# Patient Record
Sex: FEMALE | Race: BLACK OR AFRICAN AMERICAN | URBAN - METROPOLITAN AREA
[De-identification: names, ages, dates, MRNs, and addresses within clinical notes are randomized per-mention and may not be internally consistent; named-entity substitution may affect disease eponyms.]

---

## 2021-12-30 ENCOUNTER — WEB ENCOUNTER (OUTPATIENT)
Dept: URBAN - METROPOLITAN AREA CLINIC 40 | Facility: CLINIC | Age: 61
End: 2021-12-30

## 2021-12-30 ENCOUNTER — OFFICE VISIT (OUTPATIENT)
Dept: URBAN - METROPOLITAN AREA CLINIC 40 | Facility: CLINIC | Age: 61
End: 2021-12-30
Payer: COMMERCIAL

## 2021-12-30 VITALS
DIASTOLIC BLOOD PRESSURE: 78 MMHG | BODY MASS INDEX: 35.37 KG/M2 | HEART RATE: 84 BPM | OXYGEN SATURATION: 98 % | TEMPERATURE: 97.7 F | WEIGHT: 199.6 LBS | HEIGHT: 63 IN | SYSTOLIC BLOOD PRESSURE: 114 MMHG

## 2021-12-30 DIAGNOSIS — K58.1 IRRITABLE BOWEL SYNDROME WITH CONSTIPATION: ICD-10-CM

## 2021-12-30 DIAGNOSIS — K57.32 DIVERTICULITIS OF LARGE INTESTINE WITHOUT PERFORATION OR ABSCESS WITHOUT BLEEDING: ICD-10-CM

## 2021-12-30 PROCEDURE — 99203 OFFICE O/P NEW LOW 30 MIN: CPT | Performed by: INTERNAL MEDICINE

## 2021-12-30 RX ORDER — LINACLOTIDE 145 UG/1
1 CAPSULE AT LEAST 30 MINUTES BEFORE THE FIRST MEAL OF THE DAY ON AN EMPTY STOMACH CAPSULE, GELATIN COATED ORAL ONCE A DAY
Status: ACTIVE | COMMUNITY

## 2021-12-30 RX ORDER — LISINOPRIL AND HYDROCHLOROTHIAZIDE 20; 25 MG/1; MG/1
1 TABLET TABLET ORAL ONCE A DAY
Status: ACTIVE | COMMUNITY

## 2021-12-30 RX ORDER — LEVOTHYROXINE SODIUM 0.12 MG/1
1 TABLET IN THE MORNING ON AN EMPTY STOMACH TABLET ORAL ONCE A DAY
Status: ACTIVE | COMMUNITY

## 2021-12-30 RX ORDER — ALLOPURINOL 100 MG/1
1 TABLET TABLET ORAL ONCE A DAY
Status: ACTIVE | COMMUNITY

## 2021-12-30 RX ORDER — LINACLOTIDE 290 UG/1
1 CAPSULE AT LEAST 30 MINUTES BEFORE THE FIRST MEAL OF THE DAY ON AN EMPTY STOMACH CAPSULE, GELATIN COATED ORAL ONCE A DAY
Qty: 30 | Refills: 3

## 2021-12-30 RX ORDER — IBUPROFEN 800 MG/1
1 TABLET WITH FOOD OR MILK AS NEEDED TABLET ORAL THREE TIMES A DAY
Status: ACTIVE | COMMUNITY

## 2021-12-30 RX ORDER — VITAMIN A 2400 MCG
1 TABLET CAPSULE ORAL ONCE A DAY
Status: ACTIVE | COMMUNITY

## 2021-12-30 RX ORDER — ATORVASTATIN CALCIUM 40 MG/1
1 TABLET TABLET, FILM COATED ORAL ONCE A DAY
Status: ACTIVE | COMMUNITY

## 2021-12-30 RX ORDER — METFORMIN HYDROCHLORIDE 500 MG/1
1 TABLET WITH A MEAL TABLET, FILM COATED ORAL ONCE A DAY
Status: DISCONTINUED | COMMUNITY

## 2021-12-30 RX ORDER — CELECOXIB 200 MG/1
1 CAPSULE WITH FOOD CAPSULE ORAL ONCE A DAY
Status: ACTIVE | COMMUNITY

## 2021-12-30 NOTE — PHYSICAL EXAM GASTROINTESTINAL
Abdomen , soft, mild LUQ tenderness, nondistended , no guarding or rigidity , no masses palpable , normal bowel sounds , Liver and Spleen , no hepatomegaly present , no hepatosplenomegaly , liver nontender , spleen not palpable

## 2021-12-30 NOTE — HPI-TODAY'S VISIT:
60 yo female here for f/u after recent ER visit for acute descending colon diverticulitis. She completed antibiotics and her pain has improved.  She has chronic constipation likely from irritable bowel syndrome with constipation predominance.  Linzess 145 mcg used to help.  Does not help anymore.  She still has to strain to have a bowel movement.  Has pain in the left lower quadrant when she has a bowel movement.  Pain gets better after she has a bowel movement.  This is probably the second time she has had a episode of diverticulitis in over 30 years. colonoscopy last year-small colon polyps/internal hemorrhoids. Father had CRC

## 2022-02-01 ENCOUNTER — OFFICE VISIT (OUTPATIENT)
Dept: URBAN - METROPOLITAN AREA CLINIC 40 | Facility: CLINIC | Age: 62
End: 2022-02-01
Payer: COMMERCIAL

## 2022-02-01 VITALS
HEART RATE: 90 BPM | HEIGHT: 63 IN | TEMPERATURE: 98 F | SYSTOLIC BLOOD PRESSURE: 126 MMHG | DIASTOLIC BLOOD PRESSURE: 80 MMHG | WEIGHT: 200 LBS | BODY MASS INDEX: 35.44 KG/M2

## 2022-02-01 DIAGNOSIS — Z80.0 FAMILY HISTORY OF COLON CANCER: ICD-10-CM

## 2022-02-01 DIAGNOSIS — K57.32 DIVERTICULITIS OF LARGE INTESTINE WITHOUT PERFORATION OR ABSCESS WITHOUT BLEEDING: ICD-10-CM

## 2022-02-01 DIAGNOSIS — Z86.010 PERSONAL HISTORY OF COLONIC POLYPS: ICD-10-CM

## 2022-02-01 DIAGNOSIS — K58.1 IRRITABLE BOWEL SYNDROME WITH CONSTIPATION: ICD-10-CM

## 2022-02-01 PROBLEM — 440630006: Status: ACTIVE | Noted: 2021-12-30

## 2022-02-01 PROBLEM — 428283002 HISTORY OF POLYP OF COLON (SITUATION): Status: ACTIVE | Noted: 2022-02-01

## 2022-02-01 PROBLEM — 4494009: Status: ACTIVE | Noted: 2021-12-30

## 2022-02-01 PROCEDURE — 99213 OFFICE O/P EST LOW 20 MIN: CPT | Performed by: PHYSICIAN ASSISTANT

## 2022-02-01 RX ORDER — LISINOPRIL AND HYDROCHLOROTHIAZIDE 20; 25 MG/1; MG/1
1 TABLET TABLET ORAL ONCE A DAY
Status: ACTIVE | COMMUNITY

## 2022-02-01 RX ORDER — LEVOTHYROXINE SODIUM 0.12 MG/1
1 TABLET IN THE MORNING ON AN EMPTY STOMACH TABLET ORAL ONCE A DAY
Status: ACTIVE | COMMUNITY

## 2022-02-01 RX ORDER — CELECOXIB 200 MG/1
1 CAPSULE WITH FOOD CAPSULE ORAL ONCE A DAY
Status: ACTIVE | COMMUNITY

## 2022-02-01 RX ORDER — LINACLOTIDE 290 UG/1
1 CAPSULE AT LEAST 30 MINUTES BEFORE THE FIRST MEAL OF THE DAY ON AN EMPTY STOMACH CAPSULE, GELATIN COATED ORAL ONCE A DAY
Qty: 30 | Refills: 3 | Status: ACTIVE | COMMUNITY

## 2022-02-01 RX ORDER — VITAMIN A 2400 MCG
1 TABLET CAPSULE ORAL ONCE A DAY
Status: ACTIVE | COMMUNITY

## 2022-02-01 RX ORDER — IBUPROFEN 800 MG/1
1 TABLET WITH FOOD OR MILK AS NEEDED TABLET ORAL THREE TIMES A DAY
Status: ACTIVE | COMMUNITY

## 2022-02-01 RX ORDER — ATORVASTATIN CALCIUM 40 MG/1
1 TABLET TABLET, FILM COATED ORAL ONCE A DAY
Status: ACTIVE | COMMUNITY

## 2022-02-01 RX ORDER — ALLOPURINOL 100 MG/1
1 TABLET TABLET ORAL ONCE A DAY
Status: ACTIVE | COMMUNITY

## 2022-02-01 NOTE — HPI-TODAY'S VISIT:
She took Linzess 290 mcg daily as prescribed for several weeks without much improvement.  Only mild improvement in that she was going more frequently but is not having complete evacuation of stool.  Denies rectal bleeding.  Occasionally will have recurrent left-sided discomfort when constipated.  This causes her to have early satiety.  She is not diabetic.  Admits she does not drink much water and does drink cranberry juice during the day.  Due to gout, she is limiting certain vegetables.  Typically eats more animal protein in the form of eggs.  Does not enjoy whole grains like oatmeal or cereal.  Does enjoy some fruit.  States that she has little time and interest in preparing certain foods after returning home.  Admits some frustration with trying to balanced diet to accommodate health issues such as gout, constipation, hypertension.  No other new complaints.

## 2022-02-01 NOTE — HPI-TODAY'S VISIT:
Ms. Be 62 year old Black female last seen by Dr. Graham 12/30/21 for f/u after recent ER visit for acute descending colon diverticulitis. She completed antibiotics and her pain has improved.  She has chronic constipation likely from irritable bowel syndrome with constipation predominance.  Linzess 145 mcg used to help.  Does not help anymore.  She still has to strain to have a bowel movement.  Has pain in the left lower quadrant when she has a bowel movement.  Pain gets better after she has a bowel movement.  This is probably the second time she has had a episode of diverticulitis in over 30 years. She had a colonoscopy last year-small colon polyps/internal hemorrhoids. Father had CRC. She has constipation.

## 2022-02-03 ENCOUNTER — TELEPHONE ENCOUNTER (OUTPATIENT)
Dept: URBAN - METROPOLITAN AREA CLINIC 40 | Facility: CLINIC | Age: 62
End: 2022-02-03

## 2022-02-03 RX ORDER — ATORVASTATIN CALCIUM 40 MG/1
1 TABLET TABLET, FILM COATED ORAL ONCE A DAY
Status: ACTIVE | COMMUNITY

## 2022-02-03 RX ORDER — CELECOXIB 200 MG/1
1 CAPSULE WITH FOOD CAPSULE ORAL ONCE A DAY
Status: ACTIVE | COMMUNITY

## 2022-02-03 RX ORDER — DICYCLOMINE HYDROCHLORIDE 20 MG/1
1 TABLET TABLET ORAL
Qty: 90 | Refills: 0 | OUTPATIENT

## 2022-02-03 RX ORDER — ALLOPURINOL 100 MG/1
1 TABLET TABLET ORAL ONCE A DAY
Status: ACTIVE | COMMUNITY

## 2022-02-03 RX ORDER — LISINOPRIL AND HYDROCHLOROTHIAZIDE 20; 25 MG/1; MG/1
1 TABLET TABLET ORAL ONCE A DAY
Status: ACTIVE | COMMUNITY

## 2022-02-03 RX ORDER — VITAMIN A 2400 MCG
1 TABLET CAPSULE ORAL ONCE A DAY
Status: ACTIVE | COMMUNITY

## 2022-02-03 RX ORDER — IBUPROFEN 800 MG/1
1 TABLET WITH FOOD OR MILK AS NEEDED TABLET ORAL THREE TIMES A DAY
Status: ACTIVE | COMMUNITY

## 2022-02-03 RX ORDER — LINACLOTIDE 290 UG/1
1 CAPSULE AT LEAST 30 MINUTES BEFORE THE FIRST MEAL OF THE DAY ON AN EMPTY STOMACH CAPSULE, GELATIN COATED ORAL ONCE A DAY
Qty: 30 | Refills: 3 | Status: ACTIVE | COMMUNITY

## 2022-02-03 RX ORDER — LEVOTHYROXINE SODIUM 0.12 MG/1
1 TABLET IN THE MORNING ON AN EMPTY STOMACH TABLET ORAL ONCE A DAY
Status: ACTIVE | COMMUNITY

## 2022-02-11 ENCOUNTER — TELEPHONE ENCOUNTER (OUTPATIENT)
Dept: URBAN - METROPOLITAN AREA CLINIC 40 | Facility: CLINIC | Age: 62
End: 2022-02-11

## 2022-03-30 ENCOUNTER — OFFICE VISIT (OUTPATIENT)
Dept: URBAN - METROPOLITAN AREA CLINIC 40 | Facility: CLINIC | Age: 62
End: 2022-03-30
Payer: COMMERCIAL

## 2022-03-30 VITALS
BODY MASS INDEX: 34.38 KG/M2 | DIASTOLIC BLOOD PRESSURE: 86 MMHG | SYSTOLIC BLOOD PRESSURE: 130 MMHG | HEART RATE: 100 BPM | HEIGHT: 63 IN | TEMPERATURE: 98.6 F | WEIGHT: 194 LBS

## 2022-03-30 DIAGNOSIS — R10.9 ABDOMINAL PAIN: ICD-10-CM

## 2022-03-30 DIAGNOSIS — Z87.19 HISTORY OF DIVERTICULITIS OF COLON: ICD-10-CM

## 2022-03-30 DIAGNOSIS — K59.01 CONSTIPATION: ICD-10-CM

## 2022-03-30 DIAGNOSIS — Z80.0 FAMILY HISTORY OF COLON CANCER: ICD-10-CM

## 2022-03-30 PROCEDURE — 99213 OFFICE O/P EST LOW 20 MIN: CPT | Performed by: PHYSICIAN ASSISTANT

## 2022-03-30 RX ORDER — LINACLOTIDE 290 UG/1
1 CAPSULE AT LEAST 30 MINUTES BEFORE THE FIRST MEAL OF THE DAY ON AN EMPTY STOMACH CAPSULE, GELATIN COATED ORAL ONCE A DAY
Qty: 30 | Refills: 3 | Status: ON HOLD | COMMUNITY

## 2022-03-30 RX ORDER — ATORVASTATIN CALCIUM 40 MG/1
1 TABLET TABLET, FILM COATED ORAL ONCE A DAY
Status: ACTIVE | COMMUNITY

## 2022-03-30 RX ORDER — PRUCALOPRIDE 2 MG/1
1 TABLET TABLET, FILM COATED ORAL ONCE A DAY
Qty: 30 | Refills: 2 | OUTPATIENT
Start: 2022-03-30 | End: 2022-06-28

## 2022-03-30 RX ORDER — DICYCLOMINE HYDROCHLORIDE 20 MG/1
1 TABLET TABLET ORAL
Qty: 90 | Refills: 0 | OUTPATIENT

## 2022-03-30 RX ORDER — DICYCLOMINE HYDROCHLORIDE 20 MG/1
1 TABLET TABLET ORAL
Qty: 90 | Refills: 0 | Status: ACTIVE | COMMUNITY

## 2022-03-30 RX ORDER — CELECOXIB 200 MG/1
1 CAPSULE WITH FOOD CAPSULE ORAL ONCE A DAY
Status: ACTIVE | COMMUNITY

## 2022-03-30 RX ORDER — LISINOPRIL AND HYDROCHLOROTHIAZIDE 20; 25 MG/1; MG/1
1 TABLET TABLET ORAL ONCE A DAY
Status: ACTIVE | COMMUNITY

## 2022-03-30 RX ORDER — IBUPROFEN 800 MG/1
1 TABLET WITH FOOD OR MILK AS NEEDED TABLET ORAL THREE TIMES A DAY
Status: ACTIVE | COMMUNITY

## 2022-03-30 RX ORDER — VITAMIN A 2400 MCG
1 TABLET CAPSULE ORAL ONCE A DAY
Status: ACTIVE | COMMUNITY

## 2022-03-30 RX ORDER — LEVOTHYROXINE SODIUM 0.12 MG/1
1 TABLET IN THE MORNING ON AN EMPTY STOMACH TABLET ORAL ONCE A DAY
Status: ACTIVE | COMMUNITY

## 2022-03-30 RX ORDER — ALLOPURINOL 100 MG/1
1 TABLET TABLET ORAL ONCE A DAY
Status: ACTIVE | COMMUNITY

## 2022-03-30 RX ORDER — SODIUM, POTASSIUM,MAG SULFATES 17.5-3.13G
ONCE SOLUTION, RECONSTITUTED, ORAL ORAL
Qty: 1 KIT | Refills: 0 | OUTPATIENT
Start: 2022-03-30 | End: 2022-03-31

## 2022-03-30 NOTE — HPI-TODAY'S VISIT:
She took Linzess 290 mcg daily as prescribed for several weeks without much improvement.  Only mild improvement in that she was going more frequently but is not having complete evacuation of stool.  Denies rectal bleeding.  Occasionally will have recurrent left-sided discomfort when constipated.  This causes her to have early satiety.  She is not diabetic.  Admits she does not drink much water and does drink cranberry juice during the day.  Due to gout, she is limiting certain vegetables.  Typically eats more animal protein in the form of eggs.  Does not enjoy whole grains like oatmeal or cereal.  Does enjoy some fruit.  States that she has little time and interest in preparing certain foods after returning home.  Admits some frustration with trying to balanced diet to accommodate health issues such as gout, constipation, hypertension.  No other new complaints. Ms. Be was seen at the [facility] ER on February 8, 2022 with complaint of ongoing abdominal pain with constipation.  When she contacted our office February 3 with abdominal pain, we recommended CT abdomen and pelvis with contrast along with liquid diet and continued use of Trulance for constipation.  With her CT abdomen and pelvis completed a ER visit, she was found to have acute diverticulitis at the junction of the sigmoid and descending colon without evidence of abscess or perforation.  She was discharged home with antibiotic therapy and told to follow-up with us to discuss colonoscopy at later date. Completion of antibiotics with resolution of pain. Believes Motegrity works best for her constipation. Needs an Rx.

## 2022-03-30 NOTE — HPI-TODAY'S VISIT:
Ms. Be 62 year old Black female last seen 2/1/22. Previously followed by Dr. Graham following ER visit for acute descending colon diverticulitis. She completed antibiotics and her pain  improved.  She has chronic constipation likely from irritable bowel syndrome with constipation predominance.  Linzess 145 mcg used to help.  Does not help anymore.  She still has to strain to have a bowel movement.  Has pain in the left lower quadrant when she has a bowel movement.  Pain gets better after she has a bowel movement.  This is probably the second time she has had a episode of diverticulitis in over 30 years. She had a colonoscopy last year-small colon polyps/internal hemorrhoids. Father had CRC. She has constipation.

## 2022-04-01 ENCOUNTER — TELEPHONE ENCOUNTER (OUTPATIENT)
Dept: URBAN - METROPOLITAN AREA CLINIC 40 | Facility: CLINIC | Age: 62
End: 2022-04-01

## 2022-04-08 ENCOUNTER — TELEPHONE ENCOUNTER (OUTPATIENT)
Dept: URBAN - METROPOLITAN AREA CLINIC 40 | Facility: CLINIC | Age: 62
End: 2022-04-08

## 2022-04-08 RX ORDER — LINACLOTIDE 145 UG/1
1 CAPSULE AT LEAST 30 MINUTES BEFORE THE FIRST MEAL OF THE DAY ON AN EMPTY STOMACH CAPSULE, GELATIN COATED ORAL ONCE A DAY
Qty: 30 | Refills: 3 | OUTPATIENT
Start: 2022-04-08 | End: 2022-08-06

## 2022-04-12 ENCOUNTER — OFFICE VISIT (OUTPATIENT)
Dept: URBAN - METROPOLITAN AREA TELEHEALTH 2 | Facility: TELEHEALTH | Age: 62
End: 2022-04-12
Payer: COMMERCIAL

## 2022-04-12 ENCOUNTER — TELEPHONE ENCOUNTER (OUTPATIENT)
Dept: URBAN - METROPOLITAN AREA CLINIC 40 | Facility: CLINIC | Age: 62
End: 2022-04-12

## 2022-04-12 ENCOUNTER — DASHBOARD ENCOUNTERS (OUTPATIENT)
Age: 62
End: 2022-04-12

## 2022-04-12 ENCOUNTER — LAB OUTSIDE AN ENCOUNTER (OUTPATIENT)
Dept: URBAN - METROPOLITAN AREA TELEHEALTH 2 | Facility: TELEHEALTH | Age: 62
End: 2022-04-12

## 2022-04-12 VITALS — HEIGHT: 63 IN

## 2022-04-12 DIAGNOSIS — Z87.19 HISTORY OF DIVERTICULITIS OF COLON: ICD-10-CM

## 2022-04-12 DIAGNOSIS — K59.09 CONSTIPATION: ICD-10-CM

## 2022-04-12 DIAGNOSIS — Z80.0 FAMILY HISTORY OF COLON CANCER: ICD-10-CM

## 2022-04-12 DIAGNOSIS — R10.84 ABDOMINAL PAIN, GENERALIZED: ICD-10-CM

## 2022-04-12 PROBLEM — 301716002: Status: ACTIVE | Noted: 2022-04-12

## 2022-04-12 PROCEDURE — 99214 OFFICE O/P EST MOD 30 MIN: CPT | Performed by: INTERNAL MEDICINE

## 2022-04-12 RX ORDER — CIPROFLOXACIN HYDROCHLORIDE 500 MG/1
1 TABLET TABLET, FILM COATED ORAL
Qty: 14 TABLET | Refills: 0 | OUTPATIENT
Start: 2022-04-12 | End: 2022-04-19

## 2022-04-12 RX ORDER — DICYCLOMINE HYDROCHLORIDE 20 MG/1
1 TABLET TABLET ORAL
Qty: 90 | Refills: 0 | OUTPATIENT

## 2022-04-12 RX ORDER — PRUCALOPRIDE 2 MG/1
1 TABLET TABLET, FILM COATED ORAL ONCE A DAY
Qty: 30 | Refills: 2 | Status: ACTIVE | COMMUNITY
Start: 2022-03-30 | End: 2022-06-28

## 2022-04-12 RX ORDER — VITAMIN A 2400 MCG
1 TABLET CAPSULE ORAL ONCE A DAY
Status: ACTIVE | COMMUNITY

## 2022-04-12 RX ORDER — LISINOPRIL AND HYDROCHLOROTHIAZIDE 20; 25 MG/1; MG/1
1 TABLET TABLET ORAL ONCE A DAY
Status: ACTIVE | COMMUNITY

## 2022-04-12 RX ORDER — METRONIDAZOLE 500 MG/1
1 TABLET TABLET, FILM COATED ORAL THREE TIMES A DAY
Qty: 21 | Refills: 0 | OUTPATIENT
Start: 2022-04-12

## 2022-04-12 RX ORDER — CELECOXIB 200 MG/1
1 CAPSULE WITH FOOD CAPSULE ORAL ONCE A DAY
Status: ACTIVE | COMMUNITY

## 2022-04-12 RX ORDER — ATORVASTATIN CALCIUM 40 MG/1
1 TABLET TABLET, FILM COATED ORAL ONCE A DAY
Status: ACTIVE | COMMUNITY

## 2022-04-12 RX ORDER — LINACLOTIDE 290 UG/1
1 CAPSULE AT LEAST 30 MINUTES BEFORE THE FIRST MEAL OF THE DAY ON AN EMPTY STOMACH CAPSULE, GELATIN COATED ORAL ONCE A DAY
Qty: 30 | Refills: 3 | Status: ON HOLD | COMMUNITY

## 2022-04-12 RX ORDER — LEVOTHYROXINE SODIUM 0.12 MG/1
1 TABLET IN THE MORNING ON AN EMPTY STOMACH TABLET ORAL ONCE A DAY
Status: ACTIVE | COMMUNITY

## 2022-04-12 RX ORDER — IBUPROFEN 800 MG/1
1 TABLET WITH FOOD OR MILK AS NEEDED TABLET ORAL THREE TIMES A DAY
Status: ACTIVE | COMMUNITY

## 2022-04-12 RX ORDER — LINACLOTIDE 145 UG/1
1 CAPSULE AT LEAST 30 MINUTES BEFORE THE FIRST MEAL OF THE DAY ON AN EMPTY STOMACH CAPSULE, GELATIN COATED ORAL ONCE A DAY
Qty: 30 | Refills: 3 | Status: ACTIVE | COMMUNITY
Start: 2022-04-08 | End: 2022-08-06

## 2022-04-12 RX ORDER — DICYCLOMINE HYDROCHLORIDE 20 MG/1
1 TABLET TABLET ORAL
Qty: 90 | Refills: 0 | Status: ACTIVE | COMMUNITY

## 2022-04-12 RX ORDER — ALLOPURINOL 100 MG/1
1 TABLET TABLET ORAL ONCE A DAY
Status: ACTIVE | COMMUNITY

## 2022-04-12 NOTE — HPI-TODAY'S VISIT:
62-year-old -American female here for complaints of left lower quadrant abdominal pain for the past 2 weeks.  She has had 2 episodes of diverticulitis this year.  Last episode was beginning of February, when she went to the ER where she had a CT done that showed left-sided diverticulitis.  She was given Cipro and Flagyl and apparently her symptoms had resolved up until recently when her pain has recurred.  She also has history of irritable bowel syndrome with constipation predominance.  Motegrity seems to be helping her some.  Linzess 290 mcg had helped in the past, but it stopped helping her at this time.  She also uses ibuprofen and celecoxib as needed.  She is scheduled for a colonoscopy later this month.  family h/o of CRC.  No fevers or chills.  She is on a liquid diet at this time and that seems to help with pain. She is very frustrated. apparently had colonoscopy in 2020-showed diverticulosis/internal hemorrhoids

## 2022-04-18 ENCOUNTER — TELEPHONE ENCOUNTER (OUTPATIENT)
Dept: URBAN - METROPOLITAN AREA CLINIC 40 | Facility: CLINIC | Age: 62
End: 2022-04-18

## 2022-04-26 ENCOUNTER — OFFICE VISIT (OUTPATIENT)
Dept: URBAN - METROPOLITAN AREA SURGERY CENTER 30 | Facility: SURGERY CENTER | Age: 62
End: 2022-04-26

## 2022-04-28 ENCOUNTER — TELEPHONE ENCOUNTER (OUTPATIENT)
Dept: URBAN - METROPOLITAN AREA CLINIC 40 | Facility: CLINIC | Age: 62
End: 2022-04-28

## 2022-04-29 PROBLEM — 312824007 FAMILY HISTORY OF CANCER OF COLON (SITUATION): Status: ACTIVE | Noted: 2022-04-12

## 2022-04-29 PROBLEM — 14760008 CONSTIPATION: Status: ACTIVE | Noted: 2022-02-03

## 2022-05-10 ENCOUNTER — WEB ENCOUNTER (OUTPATIENT)
Dept: URBAN - METROPOLITAN AREA SURGERY CENTER 30 | Facility: SURGERY CENTER | Age: 62
End: 2022-05-10

## 2022-05-13 ENCOUNTER — OFFICE VISIT (OUTPATIENT)
Dept: URBAN - METROPOLITAN AREA SURGERY CENTER 30 | Facility: SURGERY CENTER | Age: 62
End: 2022-05-13
Payer: COMMERCIAL

## 2022-05-13 ENCOUNTER — CLAIMS CREATED FROM THE CLAIM WINDOW (OUTPATIENT)
Dept: URBAN - METROPOLITAN AREA CLINIC 4 | Facility: CLINIC | Age: 62
End: 2022-05-13
Payer: COMMERCIAL

## 2022-05-13 DIAGNOSIS — D12.3 BENIGN NEOPLASM OF TRANSVERSE COLON: ICD-10-CM

## 2022-05-13 DIAGNOSIS — Z87.19 H/O ACUTE PANCREATITIS: ICD-10-CM

## 2022-05-13 DIAGNOSIS — D12.3 ADENOMA OF TRANSVERSE COLON: ICD-10-CM

## 2022-05-13 DIAGNOSIS — K63.89 CYST OF DUODENUM: ICD-10-CM

## 2022-05-13 PROCEDURE — G8907 PT DOC NO EVENTS ON DISCHARG: HCPCS | Performed by: INTERNAL MEDICINE

## 2022-05-13 PROCEDURE — 88305 TISSUE EXAM BY PATHOLOGIST: CPT | Performed by: PATHOLOGY

## 2022-05-13 PROCEDURE — 45380 COLONOSCOPY AND BIOPSY: CPT | Performed by: INTERNAL MEDICINE

## 2022-05-16 ENCOUNTER — OFFICE VISIT (OUTPATIENT)
Dept: URBAN - METROPOLITAN AREA CLINIC 40 | Facility: CLINIC | Age: 62
End: 2022-05-16